# Patient Record
Sex: MALE | Race: WHITE | Employment: UNEMPLOYED | URBAN - METROPOLITAN AREA
[De-identification: names, ages, dates, MRNs, and addresses within clinical notes are randomized per-mention and may not be internally consistent; named-entity substitution may affect disease eponyms.]

---

## 2018-02-23 RX ORDER — ASPIRIN 81 MG/1
81 TABLET ORAL
COMMUNITY

## 2018-02-23 RX ORDER — ATORVASTATIN CALCIUM 20 MG/1
20 TABLET, FILM COATED ORAL
COMMUNITY

## 2018-02-23 RX ORDER — LISINOPRIL 10 MG/1
10 TABLET ORAL
Status: ON HOLD | COMMUNITY
End: 2018-02-26

## 2018-02-24 ENCOUNTER — ANESTHESIA EVENT (OUTPATIENT)
Dept: SURGERY | Age: 68
End: 2018-02-24
Payer: MEDICARE

## 2018-02-26 ENCOUNTER — HOSPITAL ENCOUNTER (OUTPATIENT)
Dept: CARDIAC CATH/INVASIVE PROCEDURES | Age: 68
Discharge: HOME | End: 2018-02-26
Attending: INTERNAL MEDICINE | Admitting: INTERNAL MEDICINE
Payer: MEDICARE

## 2018-02-26 ENCOUNTER — ANESTHESIA (OUTPATIENT)
Dept: SURGERY | Age: 68
End: 2018-02-26
Payer: MEDICARE

## 2018-02-26 VITALS
TEMPERATURE: 98.2 F | HEIGHT: 65 IN | OXYGEN SATURATION: 100 % | SYSTOLIC BLOOD PRESSURE: 120 MMHG | HEART RATE: 79 BPM | RESPIRATION RATE: 20 BRPM | WEIGHT: 154.44 LBS | DIASTOLIC BLOOD PRESSURE: 51 MMHG | BODY MASS INDEX: 25.73 KG/M2

## 2018-02-26 LAB
ANION GAP SERPL CALC-SCNC: 7 MMOL/L (ref 3–18)
BUN SERPL-MCNC: 15 MG/DL (ref 7–18)
BUN/CREAT SERPL: 23 (ref 12–20)
CALCIUM SERPL-MCNC: 9 MG/DL (ref 8.5–10.1)
CHLORIDE SERPL-SCNC: 101 MMOL/L (ref 100–108)
CO2 SERPL-SCNC: 29 MMOL/L (ref 21–32)
CREAT SERPL-MCNC: 0.64 MG/DL (ref 0.6–1.3)
ERYTHROCYTE [DISTWIDTH] IN BLOOD BY AUTOMATED COUNT: 13.7 % (ref 11.6–14.5)
GLUCOSE SERPL-MCNC: 86 MG/DL (ref 74–99)
HCT VFR BLD AUTO: 41.4 % (ref 36–48)
HGB BLD-MCNC: 14.4 G/DL (ref 13–16)
INR PPP: 1.1 (ref 0.8–1.2)
MAGNESIUM SERPL-MCNC: 2.2 MG/DL (ref 1.6–2.6)
MCH RBC QN AUTO: 30.3 PG (ref 24–34)
MCHC RBC AUTO-ENTMCNC: 34.8 G/DL (ref 31–37)
MCV RBC AUTO: 87.2 FL (ref 74–97)
PLATELET # BLD AUTO: 157 K/UL (ref 135–420)
PMV BLD AUTO: 10.9 FL (ref 9.2–11.8)
POTASSIUM SERPL-SCNC: 4 MMOL/L (ref 3.5–5.5)
PROTHROMBIN TIME: 13.7 SEC (ref 11.5–15.2)
RBC # BLD AUTO: 4.75 M/UL (ref 4.7–5.5)
SODIUM SERPL-SCNC: 137 MMOL/L (ref 136–145)
WBC # BLD AUTO: 6 K/UL (ref 4.6–13.2)

## 2018-02-26 PROCEDURE — 74011000250 HC RX REV CODE- 250

## 2018-02-26 PROCEDURE — 74011250636 HC RX REV CODE- 250/636

## 2018-02-26 PROCEDURE — 74011636320 HC RX REV CODE- 636/320: Performed by: INTERNAL MEDICINE

## 2018-02-26 PROCEDURE — 85610 PROTHROMBIN TIME: CPT | Performed by: INTERNAL MEDICINE

## 2018-02-26 PROCEDURE — 74011250636 HC RX REV CODE- 250/636: Performed by: INTERNAL MEDICINE

## 2018-02-26 PROCEDURE — 83735 ASSAY OF MAGNESIUM: CPT | Performed by: INTERNAL MEDICINE

## 2018-02-26 PROCEDURE — 93005 ELECTROCARDIOGRAM TRACING: CPT

## 2018-02-26 PROCEDURE — 80048 BASIC METABOLIC PNL TOTAL CA: CPT | Performed by: INTERNAL MEDICINE

## 2018-02-26 PROCEDURE — C1894 INTRO/SHEATH, NON-LASER: HCPCS

## 2018-02-26 PROCEDURE — 74011000250 HC RX REV CODE- 250: Performed by: INTERNAL MEDICINE

## 2018-02-26 PROCEDURE — 85027 COMPLETE CBC AUTOMATED: CPT | Performed by: INTERNAL MEDICINE

## 2018-02-26 RX ORDER — FENTANYL CITRATE 50 UG/ML
25-100 INJECTION, SOLUTION INTRAMUSCULAR; INTRAVENOUS
Status: DISCONTINUED | OUTPATIENT
Start: 2018-02-26 | End: 2018-02-26 | Stop reason: HOSPADM

## 2018-02-26 RX ORDER — LIDOCAINE HYDROCHLORIDE 10 MG/ML
INJECTION INFILTRATION; PERINEURAL
Status: COMPLETED
Start: 2018-02-26 | End: 2018-02-26

## 2018-02-26 RX ORDER — HEPARIN SODIUM 200 [USP'U]/100ML
INJECTION, SOLUTION INTRAVENOUS
Status: DISPENSED
Start: 2018-02-26 | End: 2018-02-27

## 2018-02-26 RX ORDER — LIDOCAINE HYDROCHLORIDE 10 MG/ML
3-20 INJECTION INFILTRATION; PERINEURAL
Status: DISCONTINUED | OUTPATIENT
Start: 2018-02-26 | End: 2018-02-26 | Stop reason: HOSPADM

## 2018-02-26 RX ORDER — PHENYLEPHRINE HYDROCHLORIDE 10 MG/ML
INJECTION INTRAVENOUS
Status: COMPLETED
Start: 2018-02-26 | End: 2018-02-26

## 2018-02-26 RX ORDER — VERAPAMIL HYDROCHLORIDE 2.5 MG/ML
INJECTION, SOLUTION INTRAVENOUS
Status: DISPENSED
Start: 2018-02-26 | End: 2018-02-27

## 2018-02-26 RX ORDER — LISINOPRIL 10 MG/1
5 TABLET ORAL
Qty: 30 TAB | Refills: 2 | Status: SHIPPED | OUTPATIENT
Start: 2018-02-27

## 2018-02-26 RX ORDER — PHENYLEPHRINE HYDROCHLORIDE 10 MG/ML
100 INJECTION INTRAVENOUS
Status: DISCONTINUED | OUTPATIENT
Start: 2018-02-26 | End: 2018-02-26 | Stop reason: SDUPTHER

## 2018-02-26 RX ORDER — HEPARIN SODIUM 1000 [USP'U]/ML
4000 INJECTION, SOLUTION INTRAVENOUS; SUBCUTANEOUS ONCE
Status: COMPLETED | OUTPATIENT
Start: 2018-02-26 | End: 2018-02-26

## 2018-02-26 RX ORDER — FENTANYL CITRATE 50 UG/ML
INJECTION, SOLUTION INTRAMUSCULAR; INTRAVENOUS
Status: COMPLETED
Start: 2018-02-26 | End: 2018-02-26

## 2018-02-26 RX ORDER — SODIUM CHLORIDE 0.9 % (FLUSH) 0.9 %
5-10 SYRINGE (ML) INJECTION EVERY 8 HOURS
Status: CANCELLED | OUTPATIENT
Start: 2018-02-26

## 2018-02-26 RX ORDER — PROPOFOL 10 MG/ML
INJECTION, EMULSION INTRAVENOUS
Status: DISCONTINUED | OUTPATIENT
Start: 2018-02-26 | End: 2018-02-26 | Stop reason: HOSPADM

## 2018-02-26 RX ORDER — HEPARIN SODIUM 200 [USP'U]/100ML
500 INJECTION, SOLUTION INTRAVENOUS
Status: DISCONTINUED | OUTPATIENT
Start: 2018-02-26 | End: 2018-02-26 | Stop reason: HOSPADM

## 2018-02-26 RX ORDER — SODIUM CHLORIDE 9 MG/ML
INJECTION, SOLUTION INTRAVENOUS
Status: DISCONTINUED | OUTPATIENT
Start: 2018-02-26 | End: 2018-02-26 | Stop reason: HOSPADM

## 2018-02-26 RX ORDER — NITROGLYCERIN 5 MG/ML
INJECTION, SOLUTION INTRAVENOUS
Status: DISPENSED
Start: 2018-02-26 | End: 2018-02-27

## 2018-02-26 RX ORDER — LIDOCAINE HYDROCHLORIDE 20 MG/ML
INJECTION, SOLUTION EPIDURAL; INFILTRATION; INTRACAUDAL; PERINEURAL AS NEEDED
Status: DISCONTINUED | OUTPATIENT
Start: 2018-02-26 | End: 2018-02-26 | Stop reason: HOSPADM

## 2018-02-26 RX ORDER — HEPARIN SODIUM 1000 [USP'U]/ML
INJECTION, SOLUTION INTRAVENOUS; SUBCUTANEOUS
Status: COMPLETED
Start: 2018-02-26 | End: 2018-02-26

## 2018-02-26 RX ORDER — EPHEDRINE SULFATE 50 MG/ML
INJECTION, SOLUTION INTRAVENOUS AS NEEDED
Status: DISCONTINUED | OUTPATIENT
Start: 2018-02-26 | End: 2018-02-26

## 2018-02-26 RX ORDER — LISINOPRIL 5 MG/1
5 TABLET ORAL DAILY
COMMUNITY
End: 2020-08-18

## 2018-02-26 RX ORDER — MIDAZOLAM HYDROCHLORIDE 1 MG/ML
.5-2 INJECTION, SOLUTION INTRAMUSCULAR; INTRAVENOUS
Status: DISCONTINUED | OUTPATIENT
Start: 2018-02-26 | End: 2018-02-26 | Stop reason: HOSPADM

## 2018-02-26 RX ORDER — MIDAZOLAM HYDROCHLORIDE 1 MG/ML
INJECTION, SOLUTION INTRAMUSCULAR; INTRAVENOUS AS NEEDED
Status: DISCONTINUED | OUTPATIENT
Start: 2018-02-26 | End: 2018-02-26 | Stop reason: HOSPADM

## 2018-02-26 RX ORDER — MIDAZOLAM HYDROCHLORIDE 1 MG/ML
INJECTION, SOLUTION INTRAMUSCULAR; INTRAVENOUS
Status: COMPLETED
Start: 2018-02-26 | End: 2018-02-26

## 2018-02-26 RX ORDER — PHENYLEPHRINE HYDROCHLORIDE 10 MG/ML
100 INJECTION INTRAVENOUS
Status: DISCONTINUED | OUTPATIENT
Start: 2018-02-26 | End: 2018-02-27 | Stop reason: HOSPADM

## 2018-02-26 RX ORDER — SODIUM CHLORIDE 0.9 % (FLUSH) 0.9 %
5-10 SYRINGE (ML) INJECTION AS NEEDED
Status: CANCELLED | OUTPATIENT
Start: 2018-02-26

## 2018-02-26 RX ORDER — FENTANYL CITRATE 50 UG/ML
INJECTION, SOLUTION INTRAMUSCULAR; INTRAVENOUS AS NEEDED
Status: DISCONTINUED | OUTPATIENT
Start: 2018-02-26 | End: 2018-02-26 | Stop reason: HOSPADM

## 2018-02-26 RX ADMIN — IOPAMIDOL 150 ML: 612 INJECTION, SOLUTION INTRAVENOUS at 16:18

## 2018-02-26 RX ADMIN — SODIUM CHLORIDE: 9 INJECTION, SOLUTION INTRAVENOUS at 15:09

## 2018-02-26 RX ADMIN — FENTANYL CITRATE 25 MCG: 50 INJECTION, SOLUTION INTRAMUSCULAR; INTRAVENOUS at 15:15

## 2018-02-26 RX ADMIN — FENTANYL CITRATE 25 MCG: 50 INJECTION, SOLUTION INTRAMUSCULAR; INTRAVENOUS at 15:10

## 2018-02-26 RX ADMIN — HEPARIN SODIUM 5000 UNITS: 1000 INJECTION, SOLUTION INTRAVENOUS; SUBCUTANEOUS at 15:47

## 2018-02-26 RX ADMIN — PHENYLEPHRINE HYDROCHLORIDE 100 MCG: 10 INJECTION INTRAVENOUS at 16:07

## 2018-02-26 RX ADMIN — PROPOFOL 50 MCG/KG/MIN: 10 INJECTION, EMULSION INTRAVENOUS at 15:10

## 2018-02-26 RX ADMIN — LIDOCAINE HYDROCHLORIDE 60 MG: 20 INJECTION, SOLUTION EPIDURAL; INFILTRATION; INTRACAUDAL; PERINEURAL at 15:10

## 2018-02-26 RX ADMIN — FENTANYL CITRATE 25 MCG: 50 INJECTION, SOLUTION INTRAMUSCULAR; INTRAVENOUS at 15:22

## 2018-02-26 RX ADMIN — PHENYLEPHRINE HYDROCHLORIDE 100 MCG: 10 INJECTION INTRAVENOUS at 15:41

## 2018-02-26 RX ADMIN — PHENYLEPHRINE HYDROCHLORIDE 100 MCG: 10 INJECTION INTRAVENOUS at 15:45

## 2018-02-26 RX ADMIN — VERAPAMIL HYDROCHLORIDE 3 ML: 2.5 INJECTION INTRAVENOUS at 15:41

## 2018-02-26 RX ADMIN — IOPAMIDOL 40 ML: 612 INJECTION, SOLUTION INTRAVENOUS at 16:37

## 2018-02-26 RX ADMIN — LIDOCAINE HYDROCHLORIDE 5 ML: 10 INJECTION, SOLUTION INFILTRATION; PERINEURAL at 15:40

## 2018-02-26 RX ADMIN — MIDAZOLAM HYDROCHLORIDE 2 MG: 1 INJECTION, SOLUTION INTRAMUSCULAR; INTRAVENOUS at 15:10

## 2018-02-26 RX ADMIN — SODIUM CHLORIDE: 9 INJECTION, SOLUTION INTRAVENOUS at 15:58

## 2018-02-26 RX ADMIN — PHENYLEPHRINE HYDROCHLORIDE 100 MCG: 10 INJECTION INTRAVENOUS at 15:49

## 2018-02-26 RX ADMIN — LIDOCAINE HYDROCHLORIDE 5 ML: 10 INJECTION INFILTRATION; PERINEURAL at 15:40

## 2018-02-26 RX ADMIN — FENTANYL CITRATE 25 MCG: 50 INJECTION, SOLUTION INTRAMUSCULAR; INTRAVENOUS at 15:50

## 2018-02-26 NOTE — IP AVS SNAPSHOT
[address] [address] 
[phone] Patient: Emeterio Underwood MRN: [id number] BMU:2/48/[id number] About your hospitalization You were admitted on:  February 26, 2018 You last received care in the:  [address] You were discharged on:  February 26, 2018 Why you were hospitalized Your primary diagnosis was:  Not on File Follow-up Information Follow up With Details Comments Contact Info Zheng Martin MD  as scheduled [address] [facility] [address] [facility] 
[phone] Pamela Dimmer, [address] [facility] [place] [address] [facility] 
[phone] Discharge Orders None A check bharati indicates which time of day the medication should be taken. My Medications CONTINUE taking these medications Instructions Each Dose to Equal  
 Morning Noon Evening Bedtime  
 aspirin delayed-release 81 mg tablet Your last dose was: Your next dose is: Take 81 mg by mouth nightly. 81 mg  
    
   
   
   
  
 LIPITOR 20 mg tablet Generic drug:  atorvastatin Your last dose was: Your next dose is: Take 20 mg by mouth nightly. 20 mg ASK your doctor about these medications Instructions Each Dose to Equal  
 Morning Noon Evening Bedtime * lisinopril 5 mg tablet Commonly known as:  Madonna Cliche What changed:  Another medication with the same name was changed. Make sure you understand how and when to take each. Ask about: Which instructions should I use? Your last dose was: Your next dose is: Take 5 mg by mouth daily. 5 mg * lisinopril 10 mg tablet Commonly known as:  Madonna Cliche Start taking on:  2/27/2018 What changed:  how much to take Ask about: Which instructions should I use? Your last dose was: Your next dose is: Take 0.5 Tabs by mouth nightly. 5 mg * Notice: This list has 2 medication(s) that are the same as other medications prescribed for you. Read the directions carefully, and ask your doctor or other care provider to review them with you. Where to Get Your Medications Information on where to get these meds will be given to you by the nurse or doctor. ! Ask your nurse or doctor about these medications  
  lisinopril 10 mg tablet Discharge Instructions HEART CATHETERIZATION/ANGIOGRAPHY DISCHARGE INSTRUCTIONS 1. Check puncture site frequently for swelling or bleeding. If there is any bleeding, lie down and apply pressure over the area with a clean towel or washcloth. Notify your doctor for any redness, swelling, drainage, or oozing from the puncture site. Notify your doctor for any fever or chills. 2. If the extremity becomes cold, numb, or painful call  Go to the emergency room 3. Activity should be limited for the next 48 hours. Climb stairs as little as possible and avoid any stooping, bending, or strenuous activity for 48 hours. No heavy lifting (anything over 10 pounds) for 3 days. 4. You may resume your usual diet. Drink more fluids than usual. 
5. Have a responsible person drive you home and stay with you for at least 24 hours after your heart catheterization/angiography. 6. You may remove bandage from your Right and Arm in 24 hours. You may shower in 24 hours. No tub baths, hot tubs, or swimming for 1 week. Do not place any lotions, creams, powders, or ointments over puncture site for 1 week. You may place a clean band-aid over the puncture site each day for 5 days. Change daily. I have read the above instructions and have had the opportunity to ask questions. DISCHARGE SUMMARY from Nurse PATIENT INSTRUCTIONS: 
 
 
F-face looks uneven A-arms unable to move or move unevenly S-speech slurred or non-existent T-time-call 911 as soon as signs and symptoms begin-DO NOT go Back to bed or wait to see if you get better-TIME IS BRAIN. Warning Signs of HEART ATTACK Call 911 if you have these symptoms: 
? Chest discomfort. Most heart attacks involve discomfort in the center of the chest that lasts more than a few minutes, or that goes away and comes back. It can feel like uncomfortable pressure, squeezing, fullness, or pain. ? Discomfort in other areas of the upper body. Symptoms can include pain or discomfort in one or both arms, the back, neck, jaw, or stomach. ? Shortness of breath with or without chest discomfort. ? Other signs may include breaking out in a cold sweat, nausea, or lightheadedness. Don't wait more than five minutes to call [address]! Fast action can save your life. Calling 911 is almost always the fastest way to get lifesaving treatment. Emergency Medical Services staff can begin treatment when they arrive  up to an hour sooner than if someone gets to the hospital by car. The discharge information has been reviewed with the patient and guardian. The patient and guardian verbalized understanding. Discharge medications reviewed with the patient and caregiver and appropriate educational materials and side effects teaching were provided. ___________________________________________________________________________________________________________________________________ Introducing [facility] & HEALTH SERVICES! Ariana Raza introduces Meuugame patient portal. Now you can access parts of your medical record, email your doctor's office, and request medication refills online. 1. In your internet browser, go to [email or web address] [employer] 2. Click on the First Time User? Click Here link in the Sign In box. You will see the New Member Sign Up page. 3. Enter your Meuugame Access Code exactly as it appears below. You will not need to use this code after youve completed the sign-up process. If you do not sign up before the expiration date, you must request a new code. · Meuugame Access Code: [email or web address] Expires: 5/23/2018  2:36 PM 
 
4. Enter the last four digits of your Social Security Number (xxxx) and Date of Birth (mm/dd/yyyy) as indicated and click Submit. You will be taken to the next sign-up page. 5. Create a [employer] ID. This will be your Meuugame login ID and cannot be changed, so think of one that is secure and easy to remember. 6. Create a Meuugame password. You can change your password at any time. 7. Enter your Password Reset Question and Answer. This can be used at a later time if you forget your password. 8. Enter your e-mail address. You will receive e-mail notification when new information is available in [facility] 9. Click Sign Up. You can now view and download portions of your medical record. 10. Click the Download Summary menu link to download a portable copy of your medical information. If you have questions, please visit the Frequently Asked Questions section of the Meuugame website. Remember, Meuugame is NOT to be used for urgent needs. For medical emergencies, dial 911. Now available from your iPhone and Android! Unresulted Labs-Please follow up with your PCP about these lab tests Order Current Status EKG, 12 LEAD, INITIAL Preliminary result Providers Seen During Your Hospitalization Provider Specialty Primary office phone Enedina Barber MD Cardiology [phone] Your Primary Care Physician (PCP) Primary Care Physician Office Phone Office Fax [place], [address] [facility] [address] [phone] You are allergic to the following No active allergies Recent Documentation Height Weight BMI Smoking Status 1.651 m 70.1 kg 25.7 kg/m2 Former Smoker Emergency Contacts Name Discharge Info Relation Home Work Mobile [address] NO [2] Other Relative [6]   [phone] Patient Belongings The following personal items are in your possession at time of discharge: 
     Visual Aid: None Please provide this summary of care documentation to your next provider. Signatures-by signing, you are acknowledging that this After Visit Summary has been reviewed with you and you have received a copy. Patient Signature:  ____________________________________________________________ Date:  ____________________________________________________________  
  
Serge Officer Provider Signature:  ____________________________________________________________ Date:  ____________________________________________________________

## 2018-02-26 NOTE — PROGRESS NOTES
Discharge inst given and reviewed with pt and family,  All verbalize understanding, Dr Laure Wheat into see pt prior to discharge. Pt up to bathroom to void without incident. Dressed self. Discharged via wheelchair to car in care of sister.  Pt denies any complaints at discharge, arm bands removed and shredded

## 2018-02-26 NOTE — ROUTINE PROCESS
TRANSFER - IN REPORT:    Verbal report received from Georgia Rayo RN on Donovan Farooq  being received from care unit for ordered procedure      Report consisted of patients Situation, Background, Assessment and   Recommendations(SBAR). Information from the following report(s) SBAR was reviewed with the receiving nurse. Opportunity for questions and clarification was provided. Assessment completed upon patients arrival to unit and care assumed.        Sheath:                            Peripheral Intravenous Line:  Peripheral IV 02/26/18 Right Antecubital (Active)   Site Assessment Clean, dry, & intact 2/26/2018  1:59 PM   Phlebitis Assessment 0 2/26/2018  1:59 PM   Infiltration Assessment 0 2/26/2018  1:59 PM   Dressing Status Clean, dry, & intact 2/26/2018  1:59 PM   Dressing Type Transparent 2/26/2018  1:59 PM   Hub Color/Line Status Pink 2/26/2018  1:59 PM       Peripheral IV 02/26/18 Right Arm (Active)   Site Assessment Clean, dry, & intact 2/26/2018  2:19 PM   Phlebitis Assessment 0 2/26/2018  2:19 PM   Infiltration Assessment 0 2/26/2018  2:19 PM   Dressing Status Clean, dry, & intact 2/26/2018  2:19 PM   Dressing Type Transparent 2/26/2018  2:19 PM   Hub Color/Line Status Blue 2/26/2018  2:19 PM       Extended / Orthostatic Vitals:    Vital Signs  Level of Consciousness: Alert (02/26/18 1333)  Temp: 98.5 °F (36.9 °C) (02/26/18 1333)  Temp Source: Oral (02/26/18 1333)  Pulse (Heart Rate): 68 (02/26/18 1333)  Heart Rate Source: Monitor (02/26/18 1333)  Cardiac Rhythm: Sinus bradycardia;Bundle Branch Block (02/26/18 1333)  Resp Rate: 16 (02/26/18 1333)  BP: 130/51 (02/26/18 1333)  MAP (Calculated): 77 (02/26/18 1333)  BP 1 Location: Right arm (02/26/18 1333)  BP 1 Method: Automatic (02/26/18 1333)  MEWS Score: 1 (02/26/18 1333)         Oxygen Therapy  O2 Sat (%): 98 % (02/26/18 1333)  O2 Device: Room air (02/26/18 1333)    Accompanied by Cath Lab [facility]

## 2018-02-26 NOTE — ROUTINE PROCESS
Cardiac Cath Lab:  Pre Procedure Chart Check     Patients chart was accessed and reviewed for possible and/or scheduled procedure. Creatinine Clearance:  CREATININE: 0.64 MG/DL (02/26/18 1345)  Estimated creatinine clearance: 97.4 mL/min    Total Contrast  Load:  3 x estimated clearance amount=  292.2ml    75% of Contrast Load:  0.75 x Total Contrast Load=    219.15ml    Recent Labs      02/26/18   1345   WBC  6.0   RBC  4.75   HCT  41.4   HGB  14.4   PLT  157   INR  1.1   PTP  13.7   NA  137   K  4.0   BUN  15   CREA  0.64   GFRAA  >60   GFRNA  >60   CA  9.0       BMI: Body mass index is 25.7 kg/(m^2). ALLERGIES: No Known Allergies    Lines:        Peripheral IV 02/26/18 Right Antecubital (Active)   Site Assessment Clean, dry, & intact 2/26/2018  1:59 PM   Phlebitis Assessment 0 2/26/2018  1:59 PM   Infiltration Assessment 0 2/26/2018  1:59 PM   Dressing Status Clean, dry, & intact 2/26/2018  1:59 PM   Dressing Type Transparent 2/26/2018  1:59 PM   Hub Color/Line Status Pink 2/26/2018  1:59 PM       Peripheral IV 02/26/18 Right Arm (Active)   Site Assessment Clean, dry, & intact 2/26/2018  2:19 PM   Phlebitis Assessment 0 2/26/2018  2:19 PM   Infiltration Assessment 0 2/26/2018  2:19 PM   Dressing Status Clean, dry, & intact 2/26/2018  2:19 PM   Dressing Type Transparent 2/26/2018  2:19 PM   Hub Color/Line Status Blue 2/26/2018  2:19 PM          History:    Past Medical History:   Diagnosis Date    Ankle edema     DJD (degenerative joint disease)     Hypertension      Past Surgical History:   Procedure Laterality Date    HX KNEE REPLACEMENT Left 3/2011     There is no problem list on file for this patient.

## 2018-02-26 NOTE — ANESTHESIA PREPROCEDURE EVALUATION
Anesthetic History        Pertinent negatives: No PONV       Review of Systems / Medical History  Patient summary reviewed, nursing notes reviewed and pertinent labs reviewed    Pulmonary              Pertinent negatives: No asthma, sleep apnea and smoker (quit 2011)     Neuro/Psych           Pertinent negatives: No seizures and CVA   Cardiovascular    Hypertension: well controlled          CAD  Pertinent negatives: Past MI: ECG suggestive of ischemia this afternoon. Exercise tolerance: >4 METS     GI/Hepatic/Renal             Pertinent negatives: No GERD   Endo/Other        Arthritis  Pertinent negatives: No diabetes and obesity   Other Findings              Physical Exam    Airway  Mallampati: II  TM Distance: 4 - 6 cm  Neck ROM: normal range of motion   Mouth opening: Normal     Cardiovascular    Rhythm: regular  Rate: normal         Dental  No notable dental hx       Pulmonary  Breath sounds clear to auscultation               Abdominal  GI exam deferred       Other Findings            Anesthetic Plan    ASA: 3, emergent  Anesthesia type: MAC          Induction: Intravenous  Anesthetic plan and risks discussed with: Patient      Plan MAC. Mild systemic disease but probable active ischemia by ECG. Plan discussed with patient and family and they agree to proceed.

## 2018-02-26 NOTE — ROUTINE PROCESS
TRANSFER - OUT REPORT:  Verbal report given to [facility] RN on Raiza Dawkins being transferred to care unit for routine post - op   Report consisted of patients Situation, Background, Assessment and   Recommendations(SBAR). Information from the following report(s) SBAR was reviewed with the receiving nurse. Cath Lab Report:    Procedure:  [x ] LHC  [x ] RHC  [ ] PTCA   [ ] Peripheral   [ ] Pacemaker [ ] JAIME  [ ] [address]    Access site:   [x ] Radial     [x ] Brachial     [ ] Femoral    [ ] Jugular   [ ] Chest Wall       Sheath:           [x ] Pulled in Cath Lab   [ ] In place   [ ] To be pulled after:         Closure:          [x ] TR Band [ ] Radial Band  [x ] Right [ ] Left    [ ] Manual Pressure     [ ] Annelle Nims     [ ] Star Close    [ ] Per Close    [ ] Safe Guard    Site Assessment:   [ x] Clean, Dry, No bleeding    [ ] Minor oozing          [ ] Hematoma: Description:    Stents(s) Placement:  [ ] Left Main:                 [ ] LAD:                [ ] Circ:                [ ] RCA:                [ ] EF:     [ ] Peripheral:      [x ] N/A    Infusion [ ]Angiomax [ ] Integrelin [ ] Heparin d/c'd: Intra procedure Medications:    Anesthesia controlled sedation and monitoring.     Lines:        Peripheral IV 02/26/18 Right Antecubital (Active)   Site Assessment Clean, dry, & intact 2/26/2018  1:59 PM   Phlebitis Assessment 0 2/26/2018  1:59 PM   Infiltration Assessment 0 2/26/2018  1:59 PM   Dressing Status Clean, dry, & intact 2/26/2018  1:59 PM   Dressing Type Transparent 2/26/2018  1:59 PM   Hub Color/Line Status Pink 2/26/2018  1:59 PM       Peripheral IV 02/26/18 Left Arm (Active)   Site Assessment Clean, dry, & intact 2/26/2018  2:19 PM   Phlebitis Assessment 0 2/26/2018  2:19 PM   Infiltration Assessment 0 2/26/2018  2:19 PM   Dressing Status Clean, dry, & intact 2/26/2018  2:19 PM   Dressing Type Transparent 2/26/2018  2:19 PM   Hub Color/Line Status Blue 2/26/2018  2:19 PM     Sheath 02/26/18 (Active)   Site Assessment Clean, dry, & intact 2/26/2018  4:47 PM   Dressing Status Clean, dry, & intact 2/26/2018  4:47 PM   Dressing Type 4 X 4;Transparent 2/26/2018  4:47 PM   Hub Color/Line Status Green 2/26/2018  4:47 PM       Patient Vitals for the past 4 hrs:   Temp Pulse Resp BP SpO2   02/26/18 1647 98.1 °F (36.7 °C) 78 16 91/45 100 %   02/26/18 1643 - 84 16 97/54 100 %   02/26/18 1333 98.5 °F (36.9 °C) 68 16 130/51 98 %         Extended / Orthostatic Vitals:    Vital Signs  Level of Consciousness: Responds to Voice (02/26/18 1647)  Temp: 98.1 °F (36.7 °C) (02/26/18 1647)  Temp Source: Oral (02/26/18 1647)  Pulse (Heart Rate): 78 (02/26/18 1647)  Heart Rate Source: Monitor (02/26/18 1647)  Cardiac Rhythm: Normal sinus rhythm (02/26/18 1647)  Resp Rate: 16 (02/26/18 1647)  BP: 91/45 (02/26/18 1647)  MAP (Calculated): (!) 60 (02/26/18 1647)  BP 1 Location: Left arm (02/26/18 1647)  BP 1 Method: Automatic (02/26/18 1647)  BP Patient Position: Supine (02/26/18 1647)  MEWS Score: 3 (02/26/18 1647)         Oxygen Therapy  O2 Sat (%): 100 % (02/26/18 1647)  O2 Device: Nasal cannula (02/26/18 1647)  O2 Flow Rate (L/min): 4 l/min (02/26/18 1647)          Opportunity for questions and clarification was provided.

## 2018-02-26 NOTE — PROGRESS NOTES
Right anticub venous sheath removed, vince pressure to site, 2x2 and Tegaderm applied,  Tr band removed from right wrist area, no active drainage noted, 2x2 and Tegaderm dressing applied, neuro vasc assessment of right arm and hand WNL,

## 2018-02-26 NOTE — PROGRESS NOTES
Cardiology Progress Note        Patient: Ovidio Quiroz        Sex: male          DOA: 2/26/2018  YOB: 1950      Age:  79 y.o.        LOS:  LOS: 0 days   Assessment/Plan     Date of Surgery Update:  Ovidio Quiroz was seen and examined. History and physical has been reviewed. The patient has been examined.  There have been no significant clinical changes since the completion of the originally dated History and Physical.    Signed By: Alexandra Melton MD     February 26, 2018 3:09 PM             Signed By: Alexandra Melton MD     February 26, 2018

## 2018-02-26 NOTE — DISCHARGE INSTRUCTIONS
HEART CATHETERIZATION/ANGIOGRAPHY DISCHARGE INSTRUCTIONS    1. Check puncture site frequently for swelling or bleeding. If there is any bleeding, lie down and apply pressure over the area with a clean towel or washcloth. Notify your doctor for any redness, swelling, drainage, or oozing from the puncture site. Notify your doctor for any fever or chills. 2. If the extremity becomes cold, numb, or painful call  Go to the emergency room  3. Activity should be limited for the next 48 hours. Climb stairs as little as possible and avoid any stooping, bending, or strenuous activity for 48 hours. No heavy lifting (anything over 10 pounds) for 3 days. 4. You may resume your usual diet. Drink more fluids than usual.  5. Have a responsible person drive you home and stay with you for at least 24 hours after your heart catheterization/angiography. 6. You may remove bandage from your Right and Arm in 24 hours. You may shower in 24 hours. No tub baths, hot tubs, or swimming for 1 week. Do not place any lotions, creams, powders, or ointments over puncture site for 1 week. You may place a clean band-aid over the puncture site each day for 5 days. Change daily. I have read the above instructions and have had the opportunity to ask questions. DISCHARGE SUMMARY from Nurse    PATIENT INSTRUCTIONS:    After general anesthesia or intravenous sedation, for 24 hours or while taking prescription Narcotics:  · Limit your activities  · Do not drive and operate hazardous machinery  · Do not make important personal or business decisions  · Do  not drink alcoholic beverages  · If you have not urinated within 8 hours after discharge, please contact your surgeon on call.     Report the following to your surgeon:  · Excessive pain, swelling, redness or odor of or around the surgical area  · Temperature over 100.5  · Nausea and vomiting lasting longer than 4 hours or if unable to take medications  · Any signs of decreased circulation or nerve impairment to extremity: change in color, persistent  numbness, tingling, coldness or increase pain  · Any questions    What to do at Home:  Recommended activity: as tolerated      *  Please give a list of your current medications to your Primary Care Provider. *  Please update this list whenever your medications are discontinued, doses are      changed, or new medications (including over-the-counter products) are added. *  Please carry medication information at all times in case of emergency situations. These are general instructions for a healthy lifestyle:    No smoking/ No tobacco products/ Avoid exposure to second hand smoke  Surgeon General's Warning:  Quitting smoking now greatly reduces serious risk to your health. Obesity, smoking, and sedentary lifestyle greatly increases your risk for illness    A healthy diet, regular physical exercise & weight monitoring are important for maintaining a healthy lifestyle    You may be retaining fluid if you have a history of heart failure or if you experience any of the following symptoms:  Weight gain of 3 pounds or more overnight or 5 pounds in a week, increased swelling in our hands or feet or shortness of breath while lying flat in bed. Please call your doctor as soon as you notice any of these symptoms; do not wait until your next office visit. Recognize signs and symptoms of STROKE:    F-face looks uneven    A-arms unable to move or move unevenly    S-speech slurred or non-existent    T-time-call 911 as soon as signs and symptoms begin-DO NOT go       Back to bed or wait to see if you get better-TIME IS BRAIN. Warning Signs of HEART ATTACK     Call 911 if you have these symptoms:   Chest discomfort. Most heart attacks involve discomfort in the center of the chest that lasts more than a few minutes, or that goes away and comes back. It can feel like uncomfortable pressure, squeezing, fullness, or pain.    Discomfort in other areas of the upper body. Symptoms can include pain or discomfort in one or both arms, the back, neck, jaw, or stomach.  Shortness of breath with or without chest discomfort.  Other signs may include breaking out in a cold sweat, nausea, or lightheadedness. Don't wait more than five minutes to call 911 - MINUTES MATTER! Fast action can save your life. Calling 911 is almost always the fastest way to get lifesaving treatment. Emergency Medical Services staff can begin treatment when they arrive -- up to an hour sooner than if someone gets to the hospital by car. The discharge information has been reviewed with the patient and guardian. The patient and guardian verbalized understanding. Discharge medications reviewed with the patient and caregiver and appropriate educational materials and side effects teaching were provided.   ___________________________________________________________________________________________________________________________________

## 2018-02-27 NOTE — ANESTHESIA POSTPROCEDURE EVALUATION
Post-Anesthesia Evaluation and Assessment    Cardiovascular Function/Vital Signs  Visit Vitals    /51 (BP 1 Location: Left arm, BP Patient Position: At rest)    Pulse 79    Temp 36.8 °C (98.2 °F)    Resp 20    Ht 5' 5\" (1.651 m)    Wt 70.1 kg (154 lb 7 oz)    SpO2 100%    BMI 25.7 kg/m2       Patient is status post Procedure(s):  DR. Terese Dailey- RIGHT AND LEFT HEART CATH UNDER MAC ANESTHESIA IN CATH LAB. Nausea/Vomiting: Controlled. Postoperative hydration reviewed and adequate. Pain:  Pain Scale 1: Numeric (0 - 10) (02/26/18 1844)  Pain Intensity 1: 0 (02/26/18 1844)   Managed. Neurological Status: At baseline. Mental Status and Level of Consciousness: Arousable. Pulmonary Status:   O2 Device: Room air (02/26/18 1844)   Adequate oxygenation and airway patent. Complications related to anesthesia: None    Post-anesthesia assessment completed. No concerns. Patient has met all discharge requirements.     Signed By: Aggie Doty MD    February 26, 2018

## 2018-02-27 NOTE — PROCEDURES
[facility]    Arthurine Boas  MR#: [id number]  : 1950  ACCOUNT #: [de-identified]   DATE OF SERVICE: 2018    REASON FOR PROCEDURE:  Severe aortic stenosis. PROCEDURES PERFORMED  1. Left heart catheterization. 2.  Selective coronary angiogram.  3.  Right heart catheterization. Counseling risks, benefits and alternatives were discussed in detail with the patient and family. They all agreed to proceed. PROCEDURE AND TECHNIQUE:  The patient was brought to the cardiac catheterization lab in a fasting and nonsedated state. The patient was prepped and draped in the usual sterilized fashion. The right antecubital venous access was converted into a 6-Botswanan venous access under fluoroscopic guidance without any complication. After giving local anesthesia, the right radial area was also anesthetized with local anesthetic and then 6-Botswanan sheath was placed under fluoroscopic guidance without any complication. Anesthesia was given by anesthesiology team.  The patient's blood pressure was somewhat on the lower side and he was given Aaron-Synephrine and then the patient was also given a radial cocktail as well. The patient remained hemodynamically stable during the procedure with mild movement of the leg and arm as well. Then, a 5-Botswanan JR4 catheter was used to perform the possible right selective coronary angiography. Unable to cross with the 5-Botswanan JR4, then 5-Botswanan AL1 catheter was attempted to engage the right coronary artery and that was unsuccessful, but the same catheter was used to cross the aortic valve, and then over the exchange length J wire, a double lumen Valier pigtail catheter was advanced. Simultaneous LV and aortic pressures were measured and the patient was found to have severe aortic valve stenosis. On pullback, there was no difference between aortic lumen and ventricular luminal pressure in the aortic root.     Then 5-Botswanan AL1, 5-English JR4 catheter and then 5-English AR Mod catheter were attempted to engage the right coronary artery and that was unsuccessful. Then, a 5-English straight catheter was used and selective angiography of the right coronary artery was performed. A 6 English JL3.5 diagnostic catheter was used to perform the selective angiography of the left coronary system. Multiple angiographic views were acquired. The patient remained hemodynamically stable, and after completing the left coronary angiography, then a 6-English [place] catheter was advanced under fluoroscopic guidance without any complication. Right-sided intracardiac pressures were measured and cardiac output and cardiac index were also measured. Procedure completed without any complication. FINDINGS  CORONARY ANATOMY:  This is a right dominant coronary anatomy and LVEDP is 4 mmHg. The left main artery was short in length, normal in diameter without any disease in its ostium, body, or the site of bifurcation. The left anterior descending artery was patent in its ostium and proximal area and also has a 30% to 40% mid LAD lesion at the site of second diagonal branch and then distal to mid LAD had 70% lesion at the second septal branch area. Distal LAD is a patent wraparound artery. Small diagonal branches D1, D2 and D3 are patent with mild luminal irregularities. Left circumflex artery is a large vessel in its ostium and the proximal area, gives rise to a large OM branch, which has more than 70% lesion, and then left circumflex artery continues in the AV groove with a small branch, which further distally divided into OM branch without any significant disease. Right coronary artery had an inferior and downward origin. It is patent in the ostium and has mild proximal disease. The mid RCA has 70% tight lesion and distal RCA is patent, which further divided into tortuous small PDA and PLV branches.     FINAL IMPRESSION:  Three-vessel coronary artery disease. HEMODYNAMICS  1.  /-4 with LVEDP of 4 mmHg. 2.  Aortic pressure 91/58 mmHg. 3.  PA pressure 33/17 with mean PA pressure of 23 mmHg. 4.  RV 35/4.  5.  Pulmonary wedge pressure:  Mean pressure is 14 mmHg. A wave is 17 mmHg and V wave is 13 mmHg. 6.  RA mean pressure is 7 mmHg. A wave is 11 mmHg and V wave is 9 mmHg. Pulmonary artery saturation was 82.3% and aortic saturation was 99.9%. Cardiac output by thermodilution method 6.4 liters per minute and index is 3.62 liters per minute per square meter. Starr cardiac output and cardiac index is 7.70 liters per minute and 4.35 liters per minute per square meter. PVR is 1.17 Wood units. Aortic valve mean gradient is 52.85. Aortic valve area is 0.74 cm2. FINAL IMPRESSION  1. Severe aortic valve stenosis with a mean gradient of 52.8 mmHg and valve area 0.74 mmHg. 2.  Three-vessel coronary artery disease, mid left anterior descending have 70% lesion. 3.  Large obtuse marginal in the mid left circumflex area has a 70% lesion. 4.  Mid right coronary artery also had a napkin ring 70% lesion. 5.  No evidence of any severe pulmonary hypertension. RECOMMENDATION:  The patient will need aortic valve replacement as well as coronary revascularization procedure. Patient and family prefer at this point possible TAVR and percutaneous intervention and I have discussed the options and patient requested to go to [facility]. We will request outpatient consult and further management. Treatment plan was discussed with the patient and family.       Case Chamberlain MD MA / [place]  D: 02/26/2018 18:40     T: 02/27/2018 06:24  JOB #: [id number]

## 2018-03-04 LAB
ATRIAL RATE: 72 BPM
CALCULATED P AXIS, ECG09: 58 DEGREES
CALCULATED R AXIS, ECG10: 83 DEGREES
CALCULATED T AXIS, ECG11: -26 DEGREES
DIAGNOSIS, 93000: NORMAL
P-R INTERVAL, ECG05: 154 MS
Q-T INTERVAL, ECG07: 392 MS
QRS DURATION, ECG06: 120 MS
QTC CALCULATION (BEZET), ECG08: 429 MS
VENTRICULAR RATE, ECG03: 72 BPM

## 2020-06-03 ENCOUNTER — ANESTHESIA EVENT (OUTPATIENT)
Dept: ENDOSCOPY | Age: 70
End: 2020-06-03
Payer: MEDICARE

## 2020-06-04 ENCOUNTER — ANESTHESIA (OUTPATIENT)
Dept: ENDOSCOPY | Age: 70
End: 2020-06-04
Payer: MEDICARE

## 2020-06-04 ENCOUNTER — HOSPITAL ENCOUNTER (OUTPATIENT)
Age: 70
Setting detail: OUTPATIENT SURGERY
Discharge: HOME | End: 2020-06-04
Attending: INTERNAL MEDICINE | Admitting: INTERNAL MEDICINE
Payer: MEDICARE

## 2020-06-04 VITALS
SYSTOLIC BLOOD PRESSURE: 129 MMHG | RESPIRATION RATE: 14 BRPM | TEMPERATURE: 96.8 F | DIASTOLIC BLOOD PRESSURE: 68 MMHG | OXYGEN SATURATION: 100 % | WEIGHT: 145.4 LBS | HEIGHT: 65 IN | BODY MASS INDEX: 24.22 KG/M2 | HEART RATE: 62 BPM

## 2020-06-04 PROCEDURE — 74011000250 HC RX REV CODE- 250: Performed by: NURSE ANESTHETIST, CERTIFIED REGISTERED

## 2020-06-04 PROCEDURE — 76040000007: Performed by: INTERNAL MEDICINE

## 2020-06-04 PROCEDURE — 77030008565 HC TBNG SUC IRR ERBE -B: Performed by: INTERNAL MEDICINE

## 2020-06-04 PROCEDURE — 74011250637 HC RX REV CODE- 250/637: Performed by: NURSE ANESTHETIST, CERTIFIED REGISTERED

## 2020-06-04 PROCEDURE — 77030029384 HC SNR POLYP CAPTVR II BSC -B: Performed by: INTERNAL MEDICINE

## 2020-06-04 PROCEDURE — 77030013992 HC SNR POLYP ENDOSC BSC -B: Performed by: INTERNAL MEDICINE

## 2020-06-04 PROCEDURE — 74011000258 HC RX REV CODE- 258: Performed by: NURSE ANESTHETIST, CERTIFIED REGISTERED

## 2020-06-04 PROCEDURE — 77030021593 HC FCPS BIOP ENDOSC BSC -A: Performed by: INTERNAL MEDICINE

## 2020-06-04 PROCEDURE — 76060000032 HC ANESTHESIA 0.5 TO 1 HR: Performed by: INTERNAL MEDICINE

## 2020-06-04 PROCEDURE — 77030018846 HC SOL IRR STRL H20 ICUM -A: Performed by: INTERNAL MEDICINE

## 2020-06-04 PROCEDURE — 74011250636 HC RX REV CODE- 250/636: Performed by: NURSE ANESTHETIST, CERTIFIED REGISTERED

## 2020-06-04 PROCEDURE — 88305 TISSUE EXAM BY PATHOLOGIST: CPT

## 2020-06-04 RX ORDER — LEVOTHYROXINE SODIUM 25 UG/1
25 TABLET ORAL
COMMUNITY

## 2020-06-04 RX ORDER — FUROSEMIDE 20 MG/1
20 TABLET ORAL DAILY
COMMUNITY
End: 2020-08-18

## 2020-06-04 RX ORDER — AMIODARONE HYDROCHLORIDE 200 MG/1
200 TABLET ORAL
COMMUNITY

## 2020-06-04 RX ORDER — PROPOFOL 10 MG/ML
INJECTION, EMULSION INTRAVENOUS AS NEEDED
Status: DISCONTINUED | OUTPATIENT
Start: 2020-06-04 | End: 2020-06-04 | Stop reason: HOSPADM

## 2020-06-04 RX ORDER — DILTIAZEM HYDROCHLORIDE 180 MG/1
180 CAPSULE, EXTENDED RELEASE ORAL DAILY
COMMUNITY

## 2020-06-04 RX ORDER — FAMOTIDINE 20 MG/1
20 TABLET, FILM COATED ORAL ONCE
Status: COMPLETED | OUTPATIENT
Start: 2020-06-04 | End: 2020-06-04

## 2020-06-04 RX ORDER — SODIUM CHLORIDE, SODIUM LACTATE, POTASSIUM CHLORIDE, CALCIUM CHLORIDE 600; 310; 30; 20 MG/100ML; MG/100ML; MG/100ML; MG/100ML
50 INJECTION, SOLUTION INTRAVENOUS CONTINUOUS
Status: DISCONTINUED | OUTPATIENT
Start: 2020-06-04 | End: 2020-06-04 | Stop reason: HOSPADM

## 2020-06-04 RX ORDER — LIDOCAINE HYDROCHLORIDE 20 MG/ML
INJECTION, SOLUTION EPIDURAL; INFILTRATION; INTRACAUDAL; PERINEURAL AS NEEDED
Status: DISCONTINUED | OUTPATIENT
Start: 2020-06-04 | End: 2020-06-04 | Stop reason: HOSPADM

## 2020-06-04 RX ADMIN — PROPOFOL 30 MG: 10 INJECTION, EMULSION INTRAVENOUS at 11:56

## 2020-06-04 RX ADMIN — PROPOFOL 70 MG: 10 INJECTION, EMULSION INTRAVENOUS at 11:50

## 2020-06-04 RX ADMIN — PHENYLEPHRINE HYDROCHLORIDE 100 MCG: 10 INJECTION INTRAVENOUS at 11:56

## 2020-06-04 RX ADMIN — PHENYLEPHRINE HYDROCHLORIDE 100 MCG: 10 INJECTION INTRAVENOUS at 12:13

## 2020-06-04 RX ADMIN — PROPOFOL 30 MG: 10 INJECTION, EMULSION INTRAVENOUS at 12:03

## 2020-06-04 RX ADMIN — PROPOFOL 30 MG: 10 INJECTION, EMULSION INTRAVENOUS at 11:53

## 2020-06-04 RX ADMIN — LIDOCAINE HYDROCHLORIDE 60 MG: 20 INJECTION, SOLUTION EPIDURAL; INFILTRATION; INTRACAUDAL; PERINEURAL at 11:50

## 2020-06-04 RX ADMIN — SODIUM CHLORIDE, SODIUM LACTATE, POTASSIUM CHLORIDE, AND CALCIUM CHLORIDE 50 ML/HR: 600; 310; 30; 20 INJECTION, SOLUTION INTRAVENOUS at 11:37

## 2020-06-04 RX ADMIN — PHENYLEPHRINE HYDROCHLORIDE 100 MCG: 10 INJECTION INTRAVENOUS at 12:23

## 2020-06-04 RX ADMIN — PROPOFOL 30 MG: 10 INJECTION, EMULSION INTRAVENOUS at 11:59

## 2020-06-04 RX ADMIN — FAMOTIDINE 20 MG: 20 TABLET ORAL at 11:34

## 2020-06-04 RX ADMIN — PROPOFOL 40 MG: 10 INJECTION, EMULSION INTRAVENOUS at 12:17

## 2020-06-04 RX ADMIN — PHENYLEPHRINE HYDROCHLORIDE 100 MCG: 10 INJECTION INTRAVENOUS at 12:07

## 2020-06-04 NOTE — H&P
[email or web address]  [phone]      History and Physical    Patient: Ha Cordova MRN: [id number]  SSN: [id number]    YOB: 1950  Age: 79 y.o. Sex: male      Subjective:      Ha Cordova is a 79 y.o. male who presents with positive Cologuard test.     Past Medical History:   Diagnosis Date    Ankle edema     DJD (degenerative joint disease)     Hypertension      Past Surgical History:   Procedure Laterality Date    HX KNEE REPLACEMENT Left 3/2011    RT & LT HEART WITH C.O.  3/3/2018    JUANIS CORONARY ARTERIOGRAPH  3/3/2018      Family History   Problem Relation Age of Onset    Migraines Father      Social History     Tobacco Use    Smoking status: Former Smoker   Substance Use Topics    Alcohol use: No      Prior to Admission medications    Medication Sig Start Date End Date Taking? Authorizing Provider   lisinopril (PRINIVIL, ZESTRIL) 10 mg tablet Take 0.5 Tabs by mouth nightly. 2/27/18   Evelin Celis MD   lisinopril (PRINIVIL, ZESTRIL) 5 mg tablet Take 5 mg by mouth daily. Provider, Historical   atorvastatin (LIPITOR) 20 mg tablet Take 20 mg by mouth nightly. Provider, Historical   aspirin delayed-release 81 mg tablet Take 81 mg by mouth nightly. Provider, Historical        No Known Allergies    Review of Systems:  A comprehensive review of systems was negative except for that written in the History of Present Illness. Objective: There were no vitals filed for this visit. Physical Exam:  GENERAL: alert, cooperative, no distress, appears stated age  LUNG: clear to auscultation bilaterally  HEART: regular rate and rhythm, S1, S2 normal, no murmur, click, rub or gallop  ABDOMEN: soft, non-tender. Bowel sounds normal. No masses,  no organomegaly  NEUROLOGIC: alert & oriented x 3    Assessment:     1. Positive Cologuard    Plan:     1.  Colonoscopy    Signed By: Dipak Swift MD     June 4, 2020      Dipak Swift MD  [facility] dennis Delgado [address] [facility] - [phone]  [email or web address]

## 2020-06-04 NOTE — PROCEDURES
[email or web address]  [phone]        Brief Procedure Note    Frida Gamez  1950  [id number]    Date of Procedure: 6/4/2020    Preoperative diagnosis: Cologuard positive:   1 - 1    Postoperative diagnosis: Diverticulosis, ascending polyp, transverse polyps x 5, ddescending polyps x 3, rectal polyps x 5, internal hemorrhoids, hypertrophied anal papillae    Description of Findings: same    Sedation/Anesthesia: Monitored Anesthesia Care; See Anesthesia Note    Procedure: Procedure(s):  COLONOSCOPY w polypectomies    :  Dr. Mary Lou Arnett MD    Assistant(s): Endoscopy Technician-1: Bernetta Goodell  Endoscopy RN-1: Houston Ivey RN; Juan M Joe RN    EBL:None    Specimens:   ID Type Source Tests Collected by Time Destination   1 : Ascending polyp Preservative Colon  Russel Hughes MD 6/4/2020 1205 Pathology   2 : transverse polyps Preservative Colon  Russel Hughes MD 6/4/2020 1210 Pathology   3 : descending polyps Preservative Colon  Russel Hughes MD 6/4/2020 1217 Pathology   4 : Rectal polyps Preservative Colon  Russel Hughes MD 6/4/2020 1229 Pathology       Findings: See printed and scanned procedure note    Complications: None    Tissue Implant Device: None    Dr. Mary Lou Arnett MD  6/4/2020  12:41 PM    Mary Lou Arnett MD  [facility] [address], [address] [place] [address] [phone]  [email or web address]

## 2020-06-04 NOTE — PROGRESS NOTES
Patient may resume Eliquis in 48 hours. Jorge Leon MD  [facility] of [address] - [phone]  [email or web address]

## 2020-06-04 NOTE — ANESTHESIA POSTPROCEDURE EVALUATION
Procedure(s):  COLONOSCOPY w polypectomies. MAC, total IV anesthesia    Anesthesia Post Evaluation      Multimodal analgesia: multimodal analgesia not used between 6 hours prior to anesthesia start to PACU discharge  Patient location during evaluation: PACU  Patient participation: complete - patient participated  Level of consciousness: sleepy but conscious  Pain score: 2  Pain management: adequate  Airway patency: patent  Anesthetic complications: no  Cardiovascular status: acceptable  Respiratory status: acceptable and room air  Hydration status: acceptable  Post anesthesia nausea and vomiting:  none  Final Post Anesthesia Temperature Assessment:  Normothermia (36.0-37.5 degrees C)      INITIAL Post-op Vital signs:   Vitals Value Taken Time   /59 6/4/2020 12:52 PM   Temp 36.4 °C (97.5 °F) 6/4/2020 12:45 PM   Pulse 59 6/4/2020  1:01 PM   Resp 19 6/4/2020  1:01 PM   SpO2 100 % 6/4/2020  1:00 PM   Vitals shown include unvalidated device data.

## 2020-06-04 NOTE — ANESTHESIA PREPROCEDURE EVALUATION
Relevant Problems   No relevant active problems       Anesthetic History   No history of anesthetic complications            Review of Systems / Medical History  Patient summary reviewed, nursing notes reviewed and pertinent labs reviewed    Pulmonary  Within defined limits                 Neuro/Psych             Comments: + Hx/o mild Cognitive Impairment/MR, patient knows his name, , year, month, day of week; knows he's having a \"colon test\", but unable to offer any information regarding his PMHx and appears to lack insight into many questions; sister present as his caretaker and surrogate decision maker. .. Cardiovascular    Hypertension  Valvular problems/murmurs: aortic stenosis      Dysrhythmias : atrial fibrillation and atrial flutter  CAD, CABG and hyperlipidemia      Comments: S/P AVR (mechanical) for severe AS and CABGx1 at [facility] 2018 with hx/o paroxysmal Afib and Aflutter, now in NSR. .. CATH 2018:  FINAL IMPRESSION  1. Severe aortic valve stenosis with a mean gradient of 52.8 mmHg and valve area 0.74 mmHg. 2.  Three-vessel coronary artery disease, mid left anterior descending have 70% lesion. 3.  Large obtuse marginal in the mid left circumflex area has a 70% lesion. 4.  Mid right coronary artery also had a napkin ring 70% lesion. 5.  No evidence of any severe pulmonary hypertension.     RECOMMENDATION:  The patient will need aortic valve replacement as well as coronary revascularization procedure. Patient and family prefer at this point possible TAVR and percutaneous intervention and I have discussed the options and patient requested to [facility] [place] [facility]. We will request outpatient consult and further management. Treatment plan was discussed with the patient and family.    GI/Hepatic/Renal               Comments: + recent Cologuard Test + Endo/Other      Hypothyroidism: well controlled  Arthritis and anemia     Other Findings            Physical Exam    Airway  Mallampati: III  TM Distance: 4 - 6 cm  Neck ROM: normal range of motion   Mouth opening: Normal     Cardiovascular    Rhythm: regular  Rate: normal         Dental      Comments: Several teeth missing both upper and lower arcades, but denies any loose teeth. Pulmonary  Breath sounds clear to auscultation               Abdominal  GI exam deferred       Other Findings            Anesthetic Plan    ASA: 3  Anesthesia type: MAC and total IV anesthesia          Induction: Intravenous  Anesthetic plan and risks discussed with: Patient and Sibling      Sister present, she is his caretaker and surrogate decision maker.

## 2020-06-04 NOTE — PERIOP NOTES
Discharge instructions reviewed with patient and with patient's sister over the phone. No concerns voiced.

## 2020-06-04 NOTE — DISCHARGE INSTRUCTIONS
MAY RESUME ELIQUIS IN 50 HOURS     Learning About Diverticulosis and Diverticulitis  What are diverticulosis and diverticulitis? In diverticulosis and diverticulitis, pouches called diverticula form in the wall of the large intestine, or colon. · In diverticulosis, the pouches do not cause any pain or other symptoms. · In diverticulitis, the pouches get inflamed or infected and cause symptoms. Doctors aren't sure what causes these pouches in the colon. But they think that a low-fiber diet may play a role. Without fiber to add bulk to the stool, the colon has to work harder than normal to push the stool forward. The pressure from this may cause pouches to form in weak spots along the colon. Some people with diverticulosis get diverticulitis. But experts don't know why this happens. What are the symptoms? · In diverticulosis, most people don't have symptoms. But pouches sometimes bleed. · In diverticulitis, symptoms may last from a few hours to a week or more. They include:  ? Belly pain. This is usually in the lower left side. It is sometimes worse when you move. This is the most common symptom. ? Fever and chills. ? Bloating and gas. ? Diarrhea or constipation. ? Nausea and sometimes vomiting.  ? Not feeling like eating. How can you prevent diverticulitis? You may be able to lower your chance of getting diverticulitis. You can do this by taking steps to prevent constipation. · Eat fruits, vegetables, beans, and whole grains every day. These foods are high in fiber. · Drink plenty of fluids. (Drink enough so that your urine is light yellow or clear like water.) If you have kidney, heart, or liver disease and have to limit fluids, talk with your doctor before you increase the amount of fluids you drink. · Get at least 30 minutes of exercise on most days of the week. Walking is a good choice.  You also may want to do other activities, such as running, swimming, cycling, or playing tennis or team sports. · Take a fiber supplement, such as Citrucel or Metamucil, every day if needed. Read and follow all instructions on the label. · Schedule time each day for a bowel movement. Having a daily routine may help. Take your time and do not strain when having a bowel movement. Some people avoid nuts, seeds, berries, and popcorn. They believe that these foods might get trapped in the diverticula and cause pain. But there is no proof that these foods cause diverticulitis or make it worse. How are these problems treated? · The best way to treat diverticulosis is to avoid constipation. · Treatment for diverticulitis includes antibiotics. It often includes a change in your diet. You may need only liquids at first. Your doctor may suggest pain medicines for pain or belly cramps. In some cases, surgery may be needed. Follow-up care is a key part of your treatment and safety. Be sure to make and go to all appointments, and call your doctor if you are having problems. It's also a good idea to know your test results and keep a list of the medicines you take. Where can you learn more? Go to http://devorah-rodríguez.info/  Enter L257 in the search box to learn more about \"Learning About Diverticulosis and Diverticulitis. \"  Current as of: August 12, 2019               Content Version: 12.5  © 6402-3351 [employer]. Care instructions adapted under license by [employer] (which disclaims liability or warranty for this information). If you have questions about a medical condition or this instruction, always ask your healthcare professional. [address] any warranty or liability for your use of this information. Colon Polyps: Care Instructions  Your Care Instructions     Colon polyps are growths in the colon or the rectum. The cause of most colon polyps is not known, and most people who get them do not have any problems.  But a certain kind can turn into cancer. For this reason, regular testing for colon polyps is important for people as they get older. It is also important for anyone who has an increased risk for colon cancer. Polyps are usually found through routine colon cancer screening tests. Although most colon polyps are not cancerous, they are usually removed and then tested for cancer. Screening for colon cancer saves lives because the cancer can usually be cured if it is caught early. If you have a polyp that is the type that can turn into cancer, you may need more tests to examine your entire colon. The doctor will remove any other polyps that he or she finds, and you will be tested more often. Follow-up care is a key part of your treatment and safety. Be sure to make and go to all appointments, and call your doctor if you are having problems. It's also a good idea to know your test results and keep a list of the medicines you take. How can you care for yourself at home? Regular exams to look for colon polyps are the best way to prevent polyps from turning into colon cancer. These can include stool tests, sigmoidoscopy, colonoscopy, and CT colonography. Talk with your doctor about a testing schedule that is right for you. To prevent polyps  There is no home treatment that can prevent colon polyps. But these steps may help lower your risk for cancer. · Stay active. Being active can help you get to and stay at a healthy weight. Try to exercise on most days of the week. Walking is a good choice. · Eat well. Choose a variety of vegetables, fruits, legumes (such as peas and beans), fish, poultry, and whole grains. · Do not smoke. If you need help quitting, talk to your doctor about stop-smoking programs and medicines. These can increase your chances of quitting for good. · If you drink alcohol, limit how much you drink. Limit alcohol to 2 drinks a day for men and 1 drink a day for women. When should you call for help?    Call your doctor now or seek immediate medical care if:  · You have severe belly pain. · Your stools are maroon or very bloody. Watch closely for changes in your health, and be sure to contact your doctor if:  · You have a fever. · You have nausea or vomiting. · You have a change in bowel habits (new constipation or diarrhea). · Your symptoms get worse or are not improving as expected. Where can you learn more? Go to http://devorah-rodríguez.info/  Enter C571 in the search box to learn more about \"Colon Polyps: Care Instructions. \"  Current as of: August 22, 2019               Content Version: 12.5  © 5518-8032 [employer]. Care instructions adapted under license by [employer] (which disclaims liability or warranty for this information). If you have questions about a medical condition or this instruction, always ask your healthcare professional. [address] any warranty or liability for your use of this information. Colonoscopy: What to Expect at [address] [place] [address]  After a colonoscopy, you'll stay at the clinic for 1 to 2 hours until the medicines wear off. Then you can go home. But you'll need to arrange for a ride. Your doctor will tell you when you can eat and do your other usual activities. Your doctor will talk to you about when you'll need your next colonoscopy. Your doctor can help you decide how often you need to be checked. This will depend on the results of your test and your risk for colorectal cancer. After the test, you may be bloated or have gas pains. You may need to pass gas. If a biopsy was done or a polyp was removed, you may have streaks of blood in your stool (feces) for a few days. Problems such as heavy rectal bleeding may not occur until several weeks after the test. This isn't common. But it can happen after polyps are removed. This care sheet gives you a general idea about how long it will take for you to recover.  But each person recovers at a different pace. Follow the steps below to get better as quickly as possible. How can you care for yourself at home? Activity  · Rest when you feel tired. · You can do your normal activities when it feels okay to do so. Diet  · Follow your doctor's directions for eating. · Unless your doctor has told you not to, drink plenty of fluids. This helps to replace the fluids that were lost during the colon prep. · Do not drink alcohol. Medicines  · Your doctor will tell you if and when you can restart your medicines. He or she will also give you instructions about taking any new medicines. · If you take aspirin or some other blood thinner, ask your doctor if and when to start taking it again. Make sure that you understand exactly what your doctor wants you to do. · If polyps were removed or a biopsy was done during the test, your doctor may tell you not to take aspirin or other anti-inflammatory medicines for a few days. These include ibuprofen (Advil, Motrin) and naproxen (Aleve). Other instructions  · For your safety, do not drive or operate machinery until the medicine wears off and you can think clearly. Your doctor may tell you not to drive or operate machinery until the day after your test.  · Do not sign legal documents or make major decisions until the medicine wears off and you can think clearly. The anesthesia can make it hard for you to fully understand what you are agreeing to. Follow-up care is a key part of your treatment and safety. Be sure to make and go to all appointments, and call your doctor if you are having problems. It's also a good idea to know your test results and keep a list of the medicines you take. When should you call for help? [id number] anytime you think you may need emergency care. For example, call if:  · You passed out (lost consciousness). · You pass maroon or bloody stools. · You have trouble breathing.   Call your doctor now or seek immediate medical care if:  · You have pain that does not get better after you take pain medicine. · You are sick to your stomach or cannot drink fluids. · You have new or worse belly pain. · You have blood in your stools. · You have a fever. · You cannot pass stools or gas. Watch closely for changes in your health, and be sure to contact your doctor if you have any problems. Where can you learn more? Go to http://devorah-rodríguez.info/  Enter E264 in the search box to learn more about \"Colonoscopy: What to Expect at Home. \"  Current as of: August 22, 2019               Content Version: 12.5  © 4683-6631 [employer]. Care instructions adapted under license by [employer] (which disclaims liability or warranty for this information). If you have questions about a medical condition or this instruction, always ask your healthcare professional. [place] [address] any warranty or liability for your use of this information. DISCHARGE SUMMARY from Nurse    PATIENT INSTRUCTIONS:    After general anesthesia or intravenous sedation, for 24 hours or while taking prescription Narcotics:  · Limit your activities  · Do not drive and operate hazardous machinery  · Do not make important personal or business decisions  · Do  not drink alcoholic beverages  · If you have not urinated within 8 hours after discharge, please contact your surgeon on call. Report the following to your surgeon:  · Excessive pain, swelling, redness or odor of or around the surgical area  · Temperature over 100.5  · Nausea and vomiting lasting longer than 4 hours or if unable to take medications  · Any signs of decreased circulation or nerve impairment to extremity: change in color, persistent  numbness, tingling, coldness or increase pain  · Any questions    What to do at Home:  Recommended activity: Activity as tolerated and no driving for today.         *  Please give a list of your current medications to your Primary Care Provider. *  Please update this list whenever your medications are discontinued, doses are      changed, or new medications (including over-the-counter products) are added. *  Please carry medication information at all times in case of emergency situations. These are general instructions for a healthy lifestyle:    No smoking/ No tobacco products/ Avoid exposure to second hand smoke  Surgeon General's Warning:  Quitting smoking now greatly reduces serious risk to your health. Obesity, smoking, and sedentary lifestyle greatly increases your risk for illness    A healthy diet, regular physical exercise & weight monitoring are important for maintaining a healthy lifestyle    You may be retaining fluid if you have a history of heart failure or if you experience any of the following symptoms:  Weight gain of 3 pounds or more overnight or 5 pounds in a week, increased swelling in our hands or feet or shortness of breath while lying flat in bed. Please call your doctor as soon as you notice any of these symptoms; do not wait until your next office visit. The discharge information has been reviewed with the patient. The patient verbalized understanding. Discharge medications reviewed with the patient and appropriate educational materials and side effects teaching were provided. Patient Education        High-Fiber Diet: Care Instructions  Your Care Instructions     A high-fiber diet may help you relieve constipation and feel less bloated. Your doctor and dietitian will help you make a high-fiber eating plan based on your personal needs. The plan will include the things you like to eat. It will also make sure that you get 30 grams of fiber a day. Before you make changes to the way you eat, be sure to talk with your doctor or dietitian. Follow-up care is a key part of your treatment and safety. Be sure to make and go to all appointments, and call your doctor if you are having problems. It's also a good idea to know your test results and keep a list of the medicines you take. How can you care for yourself at home? · You can increase how much fiber you get if you eat more of certain foods. These foods include:  ? Whole-grain breads and cereals. ? Fruits, such as pears, apples, and peaches. Eat the skins, peels, and seeds, if you can.  ? Vegetables, such as broccoli, cabbage, spinach, carrots, asparagus, and squash. ? Starchy vegetables. These include potatoes with skins, kidney beans, and lima beans. · Take a fiber supplement every day if your doctor recommends it. Examples are Benefiber, Citrucel, FiberCon, and Metamucil. Ask your doctor how much to take. · Drink plenty of fluids, enough so that your urine is light yellow or clear like water. If you have kidney, heart, or liver disease and have to limit fluids, talk with your doctor before you increase the amount of fluids you drink. · Get some exercise every day. Exercise helps stool move through the colon. It also helps prevent constipation. · Keep a food diary. Try to notice and write down what foods cause gas, pain, or other symptoms. Then you can avoid these foods. Where can you learn more? Go to http://devorah-rodríguez.info/  Enter R945 in the search box to learn more about \"High-Fiber Diet: Care Instructions. \"  Current as of: August 22, 2019               Content Version: 12.5  © 1361-5691 [employer]. Care instructions adapted under license by [employer] (which disclaims liability or warranty for this information). If you have questions about a medical condition or this instruction, always ask your healthcare professional. [address] any warranty or liability for your use of this information.          ___________________________________________________________________________________________________________________________________

## (undated) DEVICE — MEDI-VAC SUCTION HIGH CAPACITY: Brand: CARDINAL HEALTH

## (undated) DEVICE — MEDI-VAC NON-CONDUCTIVE SUCTION TUBING: Brand: CARDINAL HEALTH

## (undated) DEVICE — SNARE POLYP M W27MMXL240CM OVL STIFF DISP CAPTIVATOR

## (undated) DEVICE — CATHETER SUCT TR FL TIP 14FR W/ O CTRL

## (undated) DEVICE — GOWN ISOL IMPERV UNIV, DISP, OPEN BACK, BLUE --

## (undated) DEVICE — AIRLIFE™ NASAL OXYGEN CANNULA CURVED, NONFLARED TIP WITH 14 FOOT (4.3 M) CRUSH-RESISTANT TUBING, OVER-THE-EAR STYLE: Brand: AIRLIFE™

## (undated) DEVICE — SYRINGE MED 25GA 3ML L5/8IN SUBQ PLAS W/ DETACH NDL SFTY

## (undated) DEVICE — FORCEPS BX L240CM JAW DIA2.8MM L CAP W/ NDL MIC MESH TOOTH

## (undated) DEVICE — SYR 10ML LUER LOK 1/5ML GRAD --

## (undated) DEVICE — ENDOSCOPY PUMP TUBING/ CAP SET: Brand: ERBE

## (undated) DEVICE — SNARE VASC L240CM LOOP W10MM SHTH DIA2.4MM RND STIFF CLD

## (undated) DEVICE — FLUFF AND POLYMER UNDERPAD,EXTRA HEAVY: Brand: WINGS

## (undated) DEVICE — CANNULA ORIG TL CLR W FOAM CUSHIONS AND 14FT SUPL TB 3 CHN

## (undated) DEVICE — SOLUTION IRRIG 1000ML H2O STRL BLT

## (undated) DEVICE — SYR 20ML LL STRL LF --

## (undated) DEVICE — GAUZE,SPONGE,4"X4",16PLY,STRL,LF,10/TRAY: Brand: MEDLINE

## (undated) DEVICE — FLEX ADVANTAGE 3000CC: Brand: FLEX ADVANTAGE

## (undated) DEVICE — TRAP SPEC COLL POLYP POLYSTYR --

## (undated) DEVICE — SYR 50ML SLIP TIP NSAF LF STRL --